# Patient Record
Sex: MALE | Race: WHITE
[De-identification: names, ages, dates, MRNs, and addresses within clinical notes are randomized per-mention and may not be internally consistent; named-entity substitution may affect disease eponyms.]

---

## 2017-06-28 ENCOUNTER — HOSPITAL ENCOUNTER (INPATIENT)
Dept: HOSPITAL 12 - ER | Age: 67
Discharge: LEFT BEFORE DISCHARGE | DRG: 198 | End: 2017-06-28
Payer: MEDICAID

## 2017-06-28 VITALS — BODY MASS INDEX: 23.62 KG/M2 | HEIGHT: 70 IN | WEIGHT: 165 LBS

## 2017-06-28 VITALS — SYSTOLIC BLOOD PRESSURE: 134 MMHG | DIASTOLIC BLOOD PRESSURE: 70 MMHG

## 2017-06-28 DIAGNOSIS — R00.1: ICD-10-CM

## 2017-06-28 DIAGNOSIS — Z87.442: ICD-10-CM

## 2017-06-28 DIAGNOSIS — I25.10: Primary | ICD-10-CM

## 2017-06-28 DIAGNOSIS — Z96.0: ICD-10-CM

## 2017-06-28 LAB
ALP SERPL-CCNC: 58 U/L (ref 50–136)
ALT SERPL W/O P-5'-P-CCNC: 13 U/L (ref 16–63)
AST SERPL-CCNC: 17 U/L (ref 15–37)
BASOPHILS # BLD AUTO: 0.1 K/UL (ref 0–8)
BASOPHILS NFR BLD AUTO: 1 % (ref 0–2)
BILIRUB DIRECT SERPL-MCNC: 0.1 MG/DL (ref 0–0.2)
BILIRUB SERPL-MCNC: 0.4 MG/DL (ref 0.2–1)
BUN SERPL-MCNC: 17 MG/DL (ref 7–18)
CHLORIDE SERPL-SCNC: 105 MMOL/L (ref 98–107)
CO2 SERPL-SCNC: 31 MMOL/L (ref 21–32)
CREAT SERPL-MCNC: 1.2 MG/DL (ref 0.6–1.3)
EOSINOPHIL # BLD AUTO: 0.3 K/UL (ref 0–0.7)
EOSINOPHIL NFR BLD AUTO: 3.8 % (ref 0–7)
GLUCOSE SERPL-MCNC: 99 MG/DL (ref 74–106)
HCT VFR BLD AUTO: 43.5 % (ref 40–50)
HGB BLD-MCNC: 14.4 G/DL (ref 14–18)
LYMPHOCYTES # BLD AUTO: 2.8 K/UL (ref 0.8–4.8)
LYMPHOCYTES NFR BLD AUTO: 31.1 % (ref 20.5–51.5)
MCH RBC QN AUTO: 30 UUG (ref 27–31)
MCHC RBC AUTO-ENTMCNC: 33 G/DL (ref 32–37)
MCV RBC AUTO: 90.5 FL (ref 82–92)
MONOCYTES # BLD AUTO: 0.7 K/UL (ref 0.1–1.3)
MONOCYTES NFR BLD AUTO: 8 % (ref 0–11)
NEUTROPHILS # BLD AUTO: 5.2 K/UL (ref 1.8–8.9)
NEUTROPHILS NFR BLD AUTO: 56.1 % (ref 38.5–71.5)
PLATELET # BLD AUTO: 254 K/UL (ref 150–450)
POTASSIUM SERPL-SCNC: 4.2 MMOL/L (ref 3.5–5.1)
RBC # BLD AUTO: 4.81 MIL/UL (ref 4.7–6.1)
WBC # BLD AUTO: 9.1 K/UL (ref 4–11.2)
WS STN SPEC: 6.8 G/DL (ref 6.4–8.2)

## 2018-03-23 ENCOUNTER — HOSPITAL ENCOUNTER (EMERGENCY)
Dept: HOSPITAL 72 - EMR | Age: 68
Discharge: HOME | End: 2018-03-23
Payer: MEDICARE

## 2018-03-23 VITALS — WEIGHT: 160 LBS | BODY MASS INDEX: 23.7 KG/M2 | HEIGHT: 69 IN

## 2018-03-23 VITALS — SYSTOLIC BLOOD PRESSURE: 100 MMHG | DIASTOLIC BLOOD PRESSURE: 46 MMHG

## 2018-03-23 VITALS — DIASTOLIC BLOOD PRESSURE: 46 MMHG | SYSTOLIC BLOOD PRESSURE: 100 MMHG

## 2018-03-23 VITALS — SYSTOLIC BLOOD PRESSURE: 121 MMHG | DIASTOLIC BLOOD PRESSURE: 59 MMHG

## 2018-03-23 DIAGNOSIS — R07.89: Primary | ICD-10-CM

## 2018-03-23 DIAGNOSIS — F17.200: ICD-10-CM

## 2018-03-23 DIAGNOSIS — R26.2: ICD-10-CM

## 2018-03-23 LAB
ADD MANUAL DIFF: NO
ALBUMIN SERPL-MCNC: 3.2 G/DL (ref 3.4–5)
ALBUMIN/GLOB SERPL: 0.8 {RATIO} (ref 1–2.7)
ALP SERPL-CCNC: 68 U/L (ref 46–116)
ALT SERPL-CCNC: 17 U/L (ref 12–78)
ANION GAP SERPL CALC-SCNC: 4 MMOL/L (ref 5–15)
AST SERPL-CCNC: 15 U/L (ref 15–37)
BASOPHILS NFR BLD AUTO: 1.5 % (ref 0–2)
BILIRUB SERPL-MCNC: 0.5 MG/DL (ref 0.2–1)
BUN SERPL-MCNC: 19 MG/DL (ref 7–18)
CALCIUM SERPL-MCNC: 10.6 MG/DL (ref 8.5–10.1)
CHLORIDE SERPL-SCNC: 104 MMOL/L (ref 98–107)
CK MB SERPL-MCNC: 3.1 NG/ML (ref 0–3.6)
CK SERPL-CCNC: 128 U/L (ref 26–308)
CO2 SERPL-SCNC: 31 MMOL/L (ref 21–32)
CREAT SERPL-MCNC: 1 MG/DL (ref 0.55–1.3)
EOSINOPHIL NFR BLD AUTO: 2.4 % (ref 0–3)
ERYTHROCYTE [DISTWIDTH] IN BLOOD BY AUTOMATED COUNT: 13.6 % (ref 11.6–14.8)
GLOBULIN SER-MCNC: 3.8 G/DL
HCT VFR BLD CALC: 43.8 % (ref 42–52)
HGB BLD-MCNC: 14.6 G/DL (ref 14.2–18)
LYMPHOCYTES NFR BLD AUTO: 35.4 % (ref 20–45)
MCV RBC AUTO: 89 FL (ref 80–99)
MONOCYTES NFR BLD AUTO: 9.7 % (ref 1–10)
NEUTROPHILS NFR BLD AUTO: 51.1 % (ref 45–75)
PLATELET # BLD: 273 K/UL (ref 150–450)
POTASSIUM SERPL-SCNC: 4.1 MMOL/L (ref 3.5–5.1)
RBC # BLD AUTO: 4.89 M/UL (ref 4.7–6.1)
SODIUM SERPL-SCNC: 139 MMOL/L (ref 136–145)
WBC # BLD AUTO: 6.8 K/UL (ref 4.8–10.8)

## 2018-03-23 PROCEDURE — 93005 ELECTROCARDIOGRAM TRACING: CPT

## 2018-03-23 PROCEDURE — 71045 X-RAY EXAM CHEST 1 VIEW: CPT

## 2018-03-23 PROCEDURE — 84484 ASSAY OF TROPONIN QUANT: CPT

## 2018-03-23 PROCEDURE — 80053 COMPREHEN METABOLIC PANEL: CPT

## 2018-03-23 PROCEDURE — 36415 COLL VENOUS BLD VENIPUNCTURE: CPT

## 2018-03-23 PROCEDURE — 82550 ASSAY OF CK (CPK): CPT

## 2018-03-23 PROCEDURE — 85025 COMPLETE CBC W/AUTO DIFF WBC: CPT

## 2018-03-23 PROCEDURE — 82553 CREATINE MB FRACTION: CPT

## 2018-03-23 PROCEDURE — 99283 EMERGENCY DEPT VISIT LOW MDM: CPT

## 2018-03-23 NOTE — DIAGNOSTIC IMAGING REPORT
Indication: Chest pain

 

Technique: One view of the chest

 

Comparison: none

 

Findings: No acute infiltrates, effusions, or congestion. Tortuous calcified aorta.

Normal heart size. Upper mediastinum unremarkable. There is a faint 15 mm nodule seen

in the left midlung which is not definitely evident previously.

 

Impression: 15 mm left midlung nodule, not definitely evident previously. Consider CT

for further evaluation. This was discussed by phone with Dr. Peter at the time of

interpretation

 

No acute process otherwise

## 2018-03-23 NOTE — EMERGENCY ROOM REPORT
History of Present Illness


General


Chief Complaint:  Pain


Source:  Patient





Present Illness


HPI


67-year-old male brought in by EMS from nearby ride aide after patient had 

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 

walking - endorses weakness d/t chronic pain. Thought "I was going to pass out.

"
He states he always has pain to legs, no acute worsening tonight.


Chest pain is left and right side of chest, worse with movement, reproducible.


+ smoker


Denies ETOH, drug use


No history of CAD, DM, HTN


No history of previous stroke


Patient denies fever, chills, cough


Patient seems more interested in sleeping than providing HPI - I had to 

continue to wake him up


Patient denies homelessness, states he lives with people "that he pays rent"


Allergies:  


Coded Allergies:  


     No Known Allergies (Unverified , 12/18/15)





Patient History


Past Medical History:  other - kidney stone


Past Surgical History:  none


Pertinent Family History:  none


Social History:  Reports: smoking


Immunizations:  UTD


Reviewed Nursing Documentation:  PMH: Agreed; PSxH: Agreed





Nursing Documentation-PMH


Past Medical History:  No History, Except For





Review of Systems


All Other Systems:  negative except mentioned in HPI





Physical Exam





Vital Signs








  Date Time  Temp Pulse Resp B/P (MAP) Pulse Ox O2 Delivery O2 Flow Rate FiO2


 


3/23/18 04:05 97.7 74 16 128/75 96 Room Air  





 97.7       








Sp02 EP Interpretation:  reviewed, normal


General Appearance:  normal inspection, well appearing, no apparent distress, 

alert, GCS 15, non-toxic, other - malodorous


Head:  normocephalic, atraumatic


Eyes:  bilateral eye PERRL, bilateral eye EOMI


ENT:  normal ENT inspection, hearing grossly normal, normal pharynx, no 

angioedema, normal voice, TMs + canals normal, uvula midline, moist mucus 

membranes


Neck:  normal inspection, full range of motion, supple, thyroid normal, no 

meningismus, no bony tend


Respiratory:  normal inspection, lungs clear, normal breath sounds, no rhonchi, 

no respiratory distress, no retraction, no accessory muscle use, no wheezing, 

speaking full sentences, other - Chest pain reproducible to left and right 

chest wall


Cardiovascular #1:  regular rate, rhythm, no edema, no JVD, normal capillary 

refill


Gastrointestinal:  normal inspection, normal bowel sounds, non tender, soft, no 

mass, no peritonitis, non-distended, no guarding, no hernia, no pulsatile mass


Genitourinary:  no CVA tenderness


Musculoskeletal:  normal inspection, back normal, normal range of motion, no 

calf tenderness, pelvis stable, Sylwia's Sign negative


Neurologic:  normal inspection, alert, oriented x3, responsive, CNs III-XII nml 

as tested, motor strength/tone normal, cerebellar normal, normal gait, speech 

normal


Psychiatric:  normal inspection, judgement/insight normal, mood/affect normal, 

no suicidal/homicidal ideation, no delusions


Skin:  normal inspection, normal color, no rash


Lymphatic:  normal inspection, no adenopathy





Medical Decision Making


Diagnostic Impression:  


 Primary Impression:  


 Chest pain


 Qualified Codes:  R07.9 - Chest pain, unspecified


ER Course


VSS, afebrile


ECG non-ischemic, no arrhythmia


Labs: H&H stable. No leuks. Trop WNL.


CXR negative for trauma, PTX, PNA, cardiomegaly, CHF to name a few


Was given analgesia for chronic leg pain





*Nurse notes another recent IV/venous blood draw puncture wound on his arm near 

where IV placed here. Likely has been to other ERs/hospitals recently although 

he denies


Patient likely homeless, malingering for shelter


Slept immediately upon arrival here and during entire ED stay








ER course:


Patient has remained stable during ED stay.





Disposition:





Patient is to be discharged to home


Patient is instructed to follow up with their primary care doctor within 5 

days. 


Strict return precautions discussed with patient such as fever, chills, 

worsening/severe pain, nausea, vomiting, which may indicate severe illness. 

Patient verbalizes understanding and agrees with plan. 





Please note that this Emergency Department Report was dictated using [employer] technology software, occasionally this can lead to 

erroneous entry secondary to interpretation by the dictation equipment


EKG Diagnostic Results


Rate:  normal


Rhythm:  NSR


ST Segments:  no acute changes


ASA given to the pt in ED:  No





Rhythm Strip Diag. Results


EP Interpretation:  yes


Rate:  51


Rhythm:  NSR, no PVC's, no ectopy





Chest X-Ray Diagnostic Results


Chest X-Ray Diagnostic Results :  


   Chest X-Ray Ordered:  Yes


   # of Views/Limited/Complete:  1 View


   Indication:  Chest Pain


   EP Interpretation:  Yes


   Interpretation:  no consolidation, no effusion, no pneumothorax, no acute 

cardiopulmonary disease


   Impression:  No acute disease


   Electronically Signed by:  Dr Herberth Nix MD





Last Vital Signs








  Date Time  Temp Pulse Resp B/P (MAP) Pulse Ox O2 Delivery O2 Flow Rate FiO2


 


3/23/18 04:05 97.7 74 16 128/75 96 Room Air  





 97.7       








Status:  improved


Disposition:  HOME, SELF-CARE


Referrals:  


NOT CHOSEN IPA/MD,REFERRING (PCP)











HERBERTH NIX M.D. Mar 23, 2018 04:25

## 2018-03-26 NOTE — CARDIOLOGY REPORT
--------------- APPROVED REPORT --------------





EKG Measurement

Heart [id number]

[id number]

[id number]

[id number]

[id number]





Sinus bradycardia

Moderate voltage criteria for LVH, may be normal variant

Borderline ECG

## 2019-06-13 ENCOUNTER — HOSPITAL ENCOUNTER (INPATIENT)
Dept: HOSPITAL 54 - ER | Age: 69
LOS: 1 days | Discharge: LEFT BEFORE DISCHARGE | DRG: 313 | End: 2019-06-14
Attending: INTERNAL MEDICINE | Admitting: INTERNAL MEDICINE
Payer: MEDICARE

## 2019-06-13 VITALS — BODY MASS INDEX: 21.62 KG/M2 | HEIGHT: 70 IN | WEIGHT: 151 LBS

## 2019-06-13 DIAGNOSIS — R91.8: ICD-10-CM

## 2019-06-13 DIAGNOSIS — F17.210: ICD-10-CM

## 2019-06-13 DIAGNOSIS — R07.9: Primary | ICD-10-CM

## 2019-06-13 LAB
BASOPHILS # BLD AUTO: 0.1 /CMM (ref 0–0.2)
BASOPHILS NFR BLD AUTO: 1.9 % (ref 0–2)
EOSINOPHIL NFR BLD AUTO: 3.6 % (ref 0–6)
HCT VFR BLD AUTO: 40 % (ref 39–51)
HGB BLD-MCNC: 13.4 G/DL (ref 13.5–17.5)
LYMPHOCYTES NFR BLD AUTO: 2.3 /CMM (ref 0.8–4.8)
LYMPHOCYTES NFR BLD AUTO: 38.1 % (ref 20–44)
MCHC RBC AUTO-ENTMCNC: 34 G/DL (ref 31–36)
MCV RBC AUTO: 90 FL (ref 80–96)
MONOCYTES NFR BLD AUTO: 0.5 /CMM (ref 0.1–1.3)
MONOCYTES NFR BLD AUTO: 8 % (ref 2–12)
NEUTROPHILS # BLD AUTO: 2.9 /CMM (ref 1.8–8.9)
NEUTROPHILS NFR BLD AUTO: 48.4 % (ref 43–81)
PLATELET # BLD AUTO: 286 /CMM (ref 150–450)
RBC # BLD AUTO: 4.44 MIL/UL (ref 4.5–6)
WBC NRBC COR # BLD AUTO: 5.9 K/UL (ref 4.3–11)

## 2019-06-13 PROCEDURE — G0378 HOSPITAL OBSERVATION PER HR: HCPCS

## 2019-06-14 VITALS — DIASTOLIC BLOOD PRESSURE: 68 MMHG | SYSTOLIC BLOOD PRESSURE: 137 MMHG

## 2019-06-14 VITALS — SYSTOLIC BLOOD PRESSURE: 134 MMHG | DIASTOLIC BLOOD PRESSURE: 66 MMHG

## 2019-06-14 LAB
ALBUMIN SERPL BCP-MCNC: 3.2 G/DL (ref 3.4–5)
ALP SERPL-CCNC: 54 U/L (ref 46–116)
ALT SERPL W P-5'-P-CCNC: 16 U/L (ref 12–78)
AST SERPL W P-5'-P-CCNC: 13 U/L (ref 15–37)
BASOPHILS # BLD AUTO: 0.1 /CMM (ref 0–0.2)
BASOPHILS NFR BLD AUTO: 1.4 % (ref 0–2)
BILIRUB DIRECT SERPL-MCNC: 0.1 MG/DL (ref 0–0.2)
BILIRUB SERPL-MCNC: 0.6 MG/DL (ref 0.2–1)
BUN SERPL-MCNC: 15 MG/DL (ref 7–18)
BUN SERPL-MCNC: 18 MG/DL (ref 7–18)
CALCIUM SERPL-MCNC: 10.2 MG/DL (ref 8.5–10.1)
CALCIUM SERPL-MCNC: 10.8 MG/DL (ref 8.5–10.1)
CHLORIDE SERPL-SCNC: 106 MMOL/L (ref 98–107)
CHLORIDE SERPL-SCNC: 106 MMOL/L (ref 98–107)
CHOLEST SERPL-MCNC: 158 MG/DL (ref ?–200)
CO2 SERPL-SCNC: 25 MMOL/L (ref 21–32)
CO2 SERPL-SCNC: 30 MMOL/L (ref 21–32)
CREAT SERPL-MCNC: 0.9 MG/DL (ref 0.6–1.3)
CREAT SERPL-MCNC: 1.1 MG/DL (ref 0.6–1.3)
EOSINOPHIL NFR BLD AUTO: 4.7 % (ref 0–6)
GLUCOSE SERPL-MCNC: 80 MG/DL (ref 74–106)
GLUCOSE SERPL-MCNC: 88 MG/DL (ref 74–106)
HCT VFR BLD AUTO: 41 % (ref 39–51)
HDLC SERPL-MCNC: 48 MG/DL (ref 40–60)
HGB BLD-MCNC: 13.8 G/DL (ref 13.5–17.5)
LDLC SERPL DIRECT ASSAY-MCNC: 92 MG/DL (ref 0–99)
LYMPHOCYTES NFR BLD AUTO: 2 /CMM (ref 0.8–4.8)
LYMPHOCYTES NFR BLD AUTO: 38.5 % (ref 20–44)
MAGNESIUM SERPL-MCNC: 2 MG/DL (ref 1.8–2.4)
MCHC RBC AUTO-ENTMCNC: 34 G/DL (ref 31–36)
MCV RBC AUTO: 92 FL (ref 80–96)
MONOCYTES NFR BLD AUTO: 0.5 /CMM (ref 0.1–1.3)
MONOCYTES NFR BLD AUTO: 9.3 % (ref 2–12)
NEUTROPHILS # BLD AUTO: 2.4 /CMM (ref 1.8–8.9)
NEUTROPHILS NFR BLD AUTO: 46.1 % (ref 43–81)
PHOSPHATE SERPL-MCNC: 2.5 MG/DL (ref 2.5–4.9)
PLATELET # BLD AUTO: 280 /CMM (ref 150–450)
POTASSIUM SERPL-SCNC: 3.9 MMOL/L (ref 3.5–5.1)
POTASSIUM SERPL-SCNC: 4.1 MMOL/L (ref 3.5–5.1)
PROT SERPL-MCNC: 6.6 G/DL (ref 6.4–8.2)
RBC # BLD AUTO: 4.46 MIL/UL (ref 4.5–6)
SODIUM SERPL-SCNC: 139 MMOL/L (ref 136–145)
SODIUM SERPL-SCNC: 140 MMOL/L (ref 136–145)
TRIGL SERPL-MCNC: 106 MG/DL (ref 30–150)
TSH SERPL DL<=0.005 MIU/L-ACNC: 0.78 UIU/ML (ref 0.36–3.74)
WBC NRBC COR # BLD AUTO: 5.3 K/UL (ref 4.3–11)

## 2019-12-17 ENCOUNTER — HOSPITAL ENCOUNTER (EMERGENCY)
Dept: HOSPITAL 12 - ER | Age: 69
Discharge: HOME | End: 2019-12-17
Payer: MEDICARE

## 2019-12-17 VITALS — HEIGHT: 70 IN | WEIGHT: 155 LBS | BODY MASS INDEX: 22.19 KG/M2

## 2019-12-17 VITALS — SYSTOLIC BLOOD PRESSURE: 128 MMHG | DIASTOLIC BLOOD PRESSURE: 70 MMHG

## 2019-12-17 DIAGNOSIS — F17.210: ICD-10-CM

## 2019-12-17 DIAGNOSIS — G89.29: Primary | ICD-10-CM

## 2019-12-17 DIAGNOSIS — R07.89: ICD-10-CM

## 2019-12-17 DIAGNOSIS — M79.641: ICD-10-CM

## 2019-12-17 PROCEDURE — A4663 DIALYSIS BLOOD PRESSURE CUFF: HCPCS

## 2019-12-17 PROCEDURE — A9150 MISC/EXPER NON-PRESCRIPT DRU: HCPCS

## 2020-03-22 ENCOUNTER — HOSPITAL ENCOUNTER (EMERGENCY)
Dept: HOSPITAL 12 - ER | Age: 70
Discharge: HOME | End: 2020-03-22
Payer: MEDICARE

## 2020-03-22 VITALS — WEIGHT: 155 LBS | BODY MASS INDEX: 22.19 KG/M2 | HEIGHT: 70 IN

## 2020-03-22 DIAGNOSIS — R07.9: Primary | ICD-10-CM

## 2020-03-22 DIAGNOSIS — G89.29: ICD-10-CM

## 2020-03-22 DIAGNOSIS — M62.838: ICD-10-CM

## 2020-03-22 PROCEDURE — A4663 DIALYSIS BLOOD PRESSURE CUFF: HCPCS

## 2020-03-22 PROCEDURE — 2W3LX1Z IMMOBILIZATION OF RIGHT LOWER EXTREMITY USING SPLINT: ICD-10-PCS | Performed by: EMERGENCY MEDICINE

## 2020-03-22 PROCEDURE — A9150 MISC/EXPER NON-PRESCRIPT DRU: HCPCS

## 2021-01-06 ENCOUNTER — HOSPITAL ENCOUNTER (EMERGENCY)
Dept: HOSPITAL 12 - ER | Age: 71
LOS: 1 days | Discharge: HOME | End: 2021-01-07
Payer: MEDICARE

## 2021-01-06 VITALS — WEIGHT: 150 LBS | HEIGHT: 70 IN | BODY MASS INDEX: 21.47 KG/M2

## 2021-01-06 DIAGNOSIS — Z82.49: ICD-10-CM

## 2021-01-06 DIAGNOSIS — R00.1: ICD-10-CM

## 2021-01-06 DIAGNOSIS — R91.1: ICD-10-CM

## 2021-01-06 DIAGNOSIS — R07.2: Primary | ICD-10-CM

## 2021-01-06 DIAGNOSIS — F17.210: ICD-10-CM

## 2021-01-06 LAB
ALP SERPL-CCNC: 68 U/L (ref 50–136)
ALT SERPL W/O P-5'-P-CCNC: 30 U/L (ref 16–63)
AST SERPL-CCNC: 20 U/L (ref 15–37)
BASOPHILS # BLD AUTO: 0.1 K/UL (ref 0–8)
BASOPHILS NFR BLD AUTO: 1.5 % (ref 0–2)
BILIRUB DIRECT SERPL-MCNC: 0.1 MG/DL (ref 0–0.2)
BILIRUB SERPL-MCNC: 0.4 MG/DL (ref 0.2–1)
BUN SERPL-MCNC: 23 MG/DL (ref 7–18)
CHLORIDE SERPL-SCNC: 104 MMOL/L (ref 98–107)
CO2 SERPL-SCNC: 30 MMOL/L (ref 21–32)
CREAT SERPL-MCNC: 1.5 MG/DL (ref 0.6–1.3)
EOSINOPHIL # BLD AUTO: 0.6 K/UL (ref 0–0.7)
EOSINOPHIL NFR BLD AUTO: 7.1 % (ref 0–7)
GLUCOSE SERPL-MCNC: 108 MG/DL (ref 74–106)
HCT VFR BLD AUTO: 37.2 % (ref 36.7–47.1)
HGB BLD-MCNC: 12.3 G/DL (ref 12.5–16.3)
LYMPHOCYTES # BLD AUTO: 1.5 K/UL (ref 20–40)
LYMPHOCYTES NFR BLD AUTO: 17.3 % (ref 20.5–51.5)
MCH RBC QN AUTO: 29.8 UUG (ref 23.8–33.4)
MCHC RBC AUTO-ENTMCNC: 33 G/DL (ref 32.5–36.3)
MCV RBC AUTO: 90.3 FL (ref 73–96.2)
MONOCYTES # BLD AUTO: 0.7 K/UL (ref 2–10)
MONOCYTES NFR BLD AUTO: 7.4 % (ref 0–11)
NEUTROPHILS # BLD AUTO: 6 K/UL (ref 1.8–8.9)
NEUTROPHILS NFR BLD AUTO: 66.7 % (ref 38.5–71.5)
PLATELET # BLD AUTO: 299 K/UL (ref 152–348)
POTASSIUM SERPL-SCNC: 4 MMOL/L (ref 3.5–5.1)
RBC # BLD AUTO: 4.12 MIL/UL (ref 4.06–5.63)
WBC # BLD AUTO: 9 K/UL (ref 3.6–10.2)
WS STN SPEC: 7.3 G/DL (ref 6.4–8.2)

## 2021-01-07 VITALS — SYSTOLIC BLOOD PRESSURE: 117 MMHG | DIASTOLIC BLOOD PRESSURE: 66 MMHG

## 2021-03-08 ENCOUNTER — HOSPITAL ENCOUNTER (EMERGENCY)
Dept: HOSPITAL 12 - ER | Age: 71
LOS: 1 days | Discharge: HOME | End: 2021-03-09
Payer: MEDICARE

## 2021-03-08 VITALS — WEIGHT: 155 LBS | HEIGHT: 69 IN | BODY MASS INDEX: 22.96 KG/M2

## 2021-03-08 DIAGNOSIS — R07.9: Primary | ICD-10-CM

## 2021-03-08 DIAGNOSIS — I49.1: ICD-10-CM

## 2021-03-08 DIAGNOSIS — F17.210: ICD-10-CM

## 2021-03-08 DIAGNOSIS — Z87.442: ICD-10-CM

## 2021-03-08 LAB
ALP SERPL-CCNC: 71 U/L (ref 50–136)
ALT SERPL W/O P-5'-P-CCNC: 25 U/L (ref 16–63)
AST SERPL-CCNC: 21 U/L (ref 15–37)
BASOPHILS # BLD AUTO: 0.1 K/UL (ref 0–8)
BASOPHILS NFR BLD AUTO: 1.3 % (ref 0–2)
BILIRUB DIRECT SERPL-MCNC: 0.1 MG/DL (ref 0–0.2)
BILIRUB SERPL-MCNC: 0.4 MG/DL (ref 0.2–1)
BUN SERPL-MCNC: 15 MG/DL (ref 7–18)
CHLORIDE SERPL-SCNC: 105 MMOL/L (ref 98–107)
CO2 SERPL-SCNC: 28 MMOL/L (ref 21–32)
CREAT SERPL-MCNC: 1.2 MG/DL (ref 0.6–1.3)
EOSINOPHIL # BLD AUTO: 0.2 K/UL (ref 0–0.7)
EOSINOPHIL NFR BLD AUTO: 3.5 % (ref 0–7)
GLUCOSE SERPL-MCNC: 89 MG/DL (ref 74–106)
HCT VFR BLD AUTO: 36.2 % (ref 36.7–47.1)
HGB BLD-MCNC: 12.4 G/DL (ref 12.5–16.3)
LYMPHOCYTES # BLD AUTO: 1.8 K/UL (ref 20–40)
LYMPHOCYTES NFR BLD AUTO: 30.7 % (ref 20.5–51.5)
MCH RBC QN AUTO: 32.8 UUG (ref 23.8–33.4)
MCHC RBC AUTO-ENTMCNC: 34 G/DL (ref 32.5–36.3)
MCV RBC AUTO: 95.8 FL (ref 73–96.2)
MONOCYTES # BLD AUTO: 0.4 K/UL (ref 2–10)
MONOCYTES NFR BLD AUTO: 7.5 % (ref 0–11)
NEUTROPHILS # BLD AUTO: 3.4 K/UL (ref 1.8–8.9)
NEUTROPHILS NFR BLD AUTO: 57 % (ref 38.5–71.5)
PLATELET # BLD AUTO: 271 K/UL (ref 152–348)
POTASSIUM SERPL-SCNC: 4 MMOL/L (ref 3.5–5.1)
RBC # BLD AUTO: 3.78 MIL/UL (ref 4.06–5.63)
WBC # BLD AUTO: 6 K/UL (ref 3.6–10.2)
WS STN SPEC: 6.9 G/DL (ref 6.4–8.2)

## 2021-03-08 PROCEDURE — A4663 DIALYSIS BLOOD PRESSURE CUFF: HCPCS

## 2021-03-09 VITALS — DIASTOLIC BLOOD PRESSURE: 79 MMHG | SYSTOLIC BLOOD PRESSURE: 121 MMHG

## 2022-04-06 ENCOUNTER — HOSPITAL ENCOUNTER (EMERGENCY)
Dept: HOSPITAL 12 - ER | Age: 72
LOS: 1 days | Discharge: HOME | End: 2022-04-07
Payer: MEDICARE

## 2022-04-06 VITALS — BODY MASS INDEX: 22.22 KG/M2 | HEIGHT: 69 IN | WEIGHT: 150 LBS

## 2022-04-06 DIAGNOSIS — I49.1: ICD-10-CM

## 2022-04-06 DIAGNOSIS — R91.1: ICD-10-CM

## 2022-04-06 DIAGNOSIS — Z59.00: ICD-10-CM

## 2022-04-06 DIAGNOSIS — E83.52: ICD-10-CM

## 2022-04-06 DIAGNOSIS — F17.210: ICD-10-CM

## 2022-04-06 DIAGNOSIS — Z87.442: ICD-10-CM

## 2022-04-06 DIAGNOSIS — R07.9: Primary | ICD-10-CM

## 2022-04-06 LAB
HCT VFR BLD AUTO: 36.9 % (ref 36.7–47.1)
MCH RBC QN AUTO: 31.4 UUG (ref 23.8–33.4)
MCV RBC AUTO: 90.7 FL (ref 73–96.2)
PLATELET # BLD AUTO: 314 K/UL (ref 152–348)

## 2022-04-06 PROCEDURE — 84484 ASSAY OF TROPONIN QUANT: CPT

## 2022-04-06 PROCEDURE — 99285 EMERGENCY DEPT VISIT HI MDM: CPT

## 2022-04-06 PROCEDURE — 36415 COLL VENOUS BLD VENIPUNCTURE: CPT

## 2022-04-06 PROCEDURE — A4663 DIALYSIS BLOOD PRESSURE CUFF: HCPCS

## 2022-04-06 PROCEDURE — 96372 THER/PROPH/DIAG INJ SC/IM: CPT

## 2022-04-06 PROCEDURE — 71045 X-RAY EXAM CHEST 1 VIEW: CPT

## 2022-04-06 PROCEDURE — 80048 BASIC METABOLIC PNL TOTAL CA: CPT

## 2022-04-06 PROCEDURE — 93005 ELECTROCARDIOGRAM TRACING: CPT

## 2022-04-06 PROCEDURE — 83735 ASSAY OF MAGNESIUM: CPT

## 2022-04-06 PROCEDURE — 85025 COMPLETE CBC W/AUTO DIFF WBC: CPT

## 2022-04-06 SDOH — ECONOMIC STABILITY - HOUSING INSECURITY: HOMELESSNESS UNSPECIFIED: Z59.00

## 2022-04-07 VITALS — DIASTOLIC BLOOD PRESSURE: 60 MMHG | SYSTOLIC BLOOD PRESSURE: 110 MMHG

## 2022-04-07 LAB
BUN SERPL-MCNC: 23 MG/DL (ref 7–18)
CHLORIDE SERPL-SCNC: 105 MMOL/L (ref 98–107)
CO2 SERPL-SCNC: 29 MMOL/L (ref 21–32)
CREAT SERPL-MCNC: 1.3 MG/DL (ref 0.6–1.3)
GLUCOSE SERPL-MCNC: 117 MG/DL (ref 74–106)
POTASSIUM SERPL-SCNC: 3.9 MMOL/L (ref 3.5–5.1)

## 2022-06-11 ENCOUNTER — HOSPITAL ENCOUNTER (EMERGENCY)
Dept: HOSPITAL 12 - ER | Age: 72
Discharge: HOME | End: 2022-06-11
Payer: MEDICARE

## 2022-06-11 VITALS — SYSTOLIC BLOOD PRESSURE: 136 MMHG | DIASTOLIC BLOOD PRESSURE: 75 MMHG

## 2022-06-11 VITALS — WEIGHT: 155 LBS | BODY MASS INDEX: 22.96 KG/M2 | HEIGHT: 69 IN

## 2022-06-11 DIAGNOSIS — R07.9: Primary | ICD-10-CM

## 2022-06-11 DIAGNOSIS — N13.2: ICD-10-CM

## 2022-06-11 DIAGNOSIS — I49.1: ICD-10-CM

## 2022-06-11 DIAGNOSIS — R91.1: ICD-10-CM

## 2022-06-11 DIAGNOSIS — R79.1: ICD-10-CM

## 2022-06-11 DIAGNOSIS — F17.210: ICD-10-CM

## 2022-06-11 DIAGNOSIS — E83.52: ICD-10-CM

## 2022-06-11 LAB
ALP SERPL-CCNC: 70 U/L (ref 50–136)
ALT SERPL W/O P-5'-P-CCNC: 19 U/L (ref 16–63)
AST SERPL-CCNC: 13 U/L (ref 15–37)
BILIRUB DIRECT SERPL-MCNC: 0.1 MG/DL (ref 0–0.2)
BILIRUB SERPL-MCNC: 0.2 MG/DL (ref 0.2–1)
BUN SERPL-MCNC: 26 MG/DL (ref 7–18)
CHLORIDE SERPL-SCNC: 106 MMOL/L (ref 98–107)
CO2 SERPL-SCNC: 31 MMOL/L (ref 21–32)
CREAT SERPL-MCNC: 1.2 MG/DL (ref 0.6–1.3)
GLUCOSE SERPL-MCNC: 110 MG/DL (ref 74–106)
HCT VFR BLD AUTO: 34.2 % (ref 36.7–47.1)
MCH RBC QN AUTO: 30.1 UUG (ref 23.8–33.4)
MCV RBC AUTO: 88.4 FL (ref 73–96.2)
PLATELET # BLD AUTO: 404 K/UL (ref 152–348)
POTASSIUM SERPL-SCNC: 4.2 MMOL/L (ref 3.5–5.1)
WS STN SPEC: 7.4 G/DL (ref 6.4–8.2)

## 2022-06-11 PROCEDURE — 71275 CT ANGIOGRAPHY CHEST: CPT

## 2022-06-11 PROCEDURE — 71045 X-RAY EXAM CHEST 1 VIEW: CPT

## 2022-06-11 PROCEDURE — 99406 BEHAV CHNG SMOKING 3-10 MIN: CPT

## 2022-06-11 PROCEDURE — 85025 COMPLETE CBC W/AUTO DIFF WBC: CPT

## 2022-06-11 PROCEDURE — 36415 COLL VENOUS BLD VENIPUNCTURE: CPT

## 2022-06-11 PROCEDURE — 99285 EMERGENCY DEPT VISIT HI MDM: CPT

## 2022-06-11 PROCEDURE — 85730 THROMBOPLASTIN TIME PARTIAL: CPT

## 2022-06-11 PROCEDURE — A9150 MISC/EXPER NON-PRESCRIPT DRU: HCPCS

## 2022-06-11 PROCEDURE — 80076 HEPATIC FUNCTION PANEL: CPT

## 2022-06-11 PROCEDURE — 85379 FIBRIN DEGRADATION QUANT: CPT

## 2022-06-11 PROCEDURE — 83880 ASSAY OF NATRIURETIC PEPTIDE: CPT

## 2022-06-11 PROCEDURE — 93005 ELECTROCARDIOGRAM TRACING: CPT

## 2022-06-11 PROCEDURE — 84484 ASSAY OF TROPONIN QUANT: CPT

## 2022-06-11 PROCEDURE — A4663 DIALYSIS BLOOD PRESSURE CUFF: HCPCS

## 2022-06-11 PROCEDURE — 80048 BASIC METABOLIC PNL TOTAL CA: CPT

## 2022-11-03 ENCOUNTER — HOSPITAL ENCOUNTER (EMERGENCY)
Dept: HOSPITAL 12 - ER | Age: 72
Discharge: HOME | End: 2022-11-03
Payer: COMMERCIAL

## 2022-11-03 VITALS — HEIGHT: 70 IN | WEIGHT: 155 LBS | BODY MASS INDEX: 22.19 KG/M2

## 2022-11-03 VITALS — DIASTOLIC BLOOD PRESSURE: 45 MMHG | SYSTOLIC BLOOD PRESSURE: 148 MMHG

## 2022-11-03 DIAGNOSIS — R91.1: ICD-10-CM

## 2022-11-03 DIAGNOSIS — I49.1: ICD-10-CM

## 2022-11-03 DIAGNOSIS — R94.31: ICD-10-CM

## 2022-11-03 DIAGNOSIS — Z87.442: ICD-10-CM

## 2022-11-03 DIAGNOSIS — F17.210: ICD-10-CM

## 2022-11-03 DIAGNOSIS — R07.9: Primary | ICD-10-CM

## 2022-11-03 DIAGNOSIS — R03.0: ICD-10-CM

## 2022-11-03 LAB
ALP SERPL-CCNC: 85 U/L (ref 50–136)
ALT SERPL W/O P-5'-P-CCNC: 18 U/L (ref 16–63)
AST SERPL-CCNC: 14 U/L (ref 15–37)
BILIRUB DIRECT SERPL-MCNC: 0.1 MG/DL (ref 0–0.2)
BILIRUB SERPL-MCNC: 0.3 MG/DL (ref 0.2–1)
BUN SERPL-MCNC: 38 MG/DL (ref 7–18)
CHLORIDE SERPL-SCNC: 107 MMOL/L (ref 98–107)
CO2 SERPL-SCNC: 29 MMOL/L (ref 21–32)
CREAT SERPL-MCNC: 1.2 MG/DL (ref 0.6–1.3)
GLUCOSE SERPL-MCNC: 89 MG/DL (ref 74–106)
HCT VFR BLD AUTO: 38.9 % (ref 36.7–47.1)
MCH RBC QN AUTO: 31.7 UUG (ref 23.8–33.4)
MCV RBC AUTO: 92.7 FL (ref 73–96.2)
PLATELET # BLD AUTO: 277 K/UL (ref 152–348)
POTASSIUM SERPL-SCNC: 4 MMOL/L (ref 3.5–5.1)
WS STN SPEC: 7.3 G/DL (ref 6.4–8.2)

## 2022-11-03 PROCEDURE — A4663 DIALYSIS BLOOD PRESSURE CUFF: HCPCS

## 2022-11-09 ENCOUNTER — HOSPITAL ENCOUNTER (EMERGENCY)
Dept: HOSPITAL 12 - ER | Age: 72
Discharge: LEFT BEFORE DISCHARGE | End: 2022-11-09
Payer: MEDICARE

## 2022-11-09 VITALS — BODY MASS INDEX: 22.9 KG/M2 | WEIGHT: 160 LBS | HEIGHT: 70 IN

## 2022-11-09 VITALS — DIASTOLIC BLOOD PRESSURE: 69 MMHG | SYSTOLIC BLOOD PRESSURE: 137 MMHG

## 2022-11-09 DIAGNOSIS — F17.210: ICD-10-CM

## 2022-11-09 DIAGNOSIS — Z87.442: ICD-10-CM

## 2022-11-09 DIAGNOSIS — R07.2: Primary | ICD-10-CM

## 2022-11-09 LAB
BUN SERPL-MCNC: 18 MG/DL (ref 7–18)
CHLORIDE SERPL-SCNC: 105 MMOL/L (ref 98–107)
CO2 SERPL-SCNC: 29 MMOL/L (ref 21–32)
CREAT SERPL-MCNC: 1.1 MG/DL (ref 0.6–1.3)
GLUCOSE SERPL-MCNC: 82 MG/DL (ref 74–106)
HCT VFR BLD AUTO: 38.6 % (ref 36.7–47.1)
MCH RBC QN AUTO: 31 UUG (ref 23.8–33.4)
MCV RBC AUTO: 89.9 FL (ref 73–96.2)
PLATELET # BLD AUTO: 287 K/UL (ref 152–348)
POTASSIUM SERPL-SCNC: 4.2 MMOL/L (ref 3.5–5.1)

## 2022-11-09 PROCEDURE — A4663 DIALYSIS BLOOD PRESSURE CUFF: HCPCS
